# Patient Record
Sex: MALE | Race: BLACK OR AFRICAN AMERICAN | Employment: FULL TIME | URBAN - METROPOLITAN AREA
[De-identification: names, ages, dates, MRNs, and addresses within clinical notes are randomized per-mention and may not be internally consistent; named-entity substitution may affect disease eponyms.]

---

## 2018-01-30 ENCOUNTER — APPOINTMENT (OUTPATIENT)
Dept: GENERAL RADIOLOGY | Age: 38
End: 2018-01-30
Attending: PHYSICIAN ASSISTANT
Payer: COMMERCIAL

## 2018-01-30 ENCOUNTER — HOSPITAL ENCOUNTER (EMERGENCY)
Age: 38
Discharge: HOME | End: 2018-01-30
Attending: EMERGENCY MEDICINE
Payer: COMMERCIAL

## 2018-01-30 VITALS
HEART RATE: 102 BPM | SYSTOLIC BLOOD PRESSURE: 115 MMHG | WEIGHT: 195 LBS | HEIGHT: 78 IN | OXYGEN SATURATION: 95 % | DIASTOLIC BLOOD PRESSURE: 70 MMHG | RESPIRATION RATE: 20 BRPM | TEMPERATURE: 99.5 F | BODY MASS INDEX: 22.56 KG/M2

## 2018-01-30 DIAGNOSIS — J11.1 INFLUENZA: Primary | ICD-10-CM

## 2018-01-30 LAB
ANION GAP SERPL CALC-SCNC: 10 MMOL/L (ref 5–15)
APPEARANCE UR: CLEAR
BACTERIA URNS QL MICRO: NEGATIVE /HPF
BILIRUB UR QL: NEGATIVE
BUN SERPL-MCNC: 8 MG/DL (ref 6–20)
BUN/CREAT SERPL: 7 (ref 12–20)
CALCIUM SERPL-MCNC: 8.1 MG/DL (ref 8.5–10.1)
CHLORIDE SERPL-SCNC: 102 MMOL/L (ref 97–108)
CK SERPL-CCNC: 268 U/L (ref 39–308)
CO2 SERPL-SCNC: 25 MMOL/L (ref 21–32)
COLOR UR: ABNORMAL
CREAT SERPL-MCNC: 1.17 MG/DL (ref 0.7–1.3)
EPITH CASTS URNS QL MICRO: ABNORMAL /LPF
GLUCOSE SERPL-MCNC: 112 MG/DL (ref 65–100)
GLUCOSE UR STRIP.AUTO-MCNC: NEGATIVE MG/DL
HGB UR QL STRIP: ABNORMAL
HYALINE CASTS URNS QL MICRO: ABNORMAL /LPF (ref 0–5)
KETONES UR QL STRIP.AUTO: 80 MG/DL
LEUKOCYTE ESTERASE UR QL STRIP.AUTO: NEGATIVE
NITRITE UR QL STRIP.AUTO: NEGATIVE
PH UR STRIP: 6 [PH] (ref 5–8)
POTASSIUM SERPL-SCNC: 3.4 MMOL/L (ref 3.5–5.1)
PROT UR STRIP-MCNC: NEGATIVE MG/DL
RBC #/AREA URNS HPF: ABNORMAL /HPF (ref 0–5)
SODIUM SERPL-SCNC: 137 MMOL/L (ref 136–145)
SP GR UR REFRACTOMETRY: 1.02 (ref 1–1.03)
UR CULT HOLD, URHOLD: NORMAL
UROBILINOGEN UR QL STRIP.AUTO: 1 EU/DL (ref 0.2–1)
WBC URNS QL MICRO: ABNORMAL /HPF (ref 0–4)

## 2018-01-30 PROCEDURE — 74011250636 HC RX REV CODE- 250/636: Performed by: PHYSICIAN ASSISTANT

## 2018-01-30 PROCEDURE — 96361 HYDRATE IV INFUSION ADD-ON: CPT

## 2018-01-30 PROCEDURE — 71046 X-RAY EXAM CHEST 2 VIEWS: CPT

## 2018-01-30 PROCEDURE — 82550 ASSAY OF CK (CPK): CPT | Performed by: PHYSICIAN ASSISTANT

## 2018-01-30 PROCEDURE — 87086 URINE CULTURE/COLONY COUNT: CPT | Performed by: PHYSICIAN ASSISTANT

## 2018-01-30 PROCEDURE — 36415 COLL VENOUS BLD VENIPUNCTURE: CPT | Performed by: PHYSICIAN ASSISTANT

## 2018-01-30 PROCEDURE — 81001 URINALYSIS AUTO W/SCOPE: CPT | Performed by: PHYSICIAN ASSISTANT

## 2018-01-30 PROCEDURE — 80048 BASIC METABOLIC PNL TOTAL CA: CPT | Performed by: PHYSICIAN ASSISTANT

## 2018-01-30 PROCEDURE — 74011250637 HC RX REV CODE- 250/637: Performed by: PHYSICIAN ASSISTANT

## 2018-01-30 PROCEDURE — 99284 EMERGENCY DEPT VISIT MOD MDM: CPT

## 2018-01-30 PROCEDURE — 96374 THER/PROPH/DIAG INJ IV PUSH: CPT

## 2018-01-30 RX ORDER — FAMOTIDINE 20 MG/1
20 TABLET, FILM COATED ORAL
Status: COMPLETED | OUTPATIENT
Start: 2018-01-30 | End: 2018-01-30

## 2018-01-30 RX ORDER — ONDANSETRON 4 MG/1
4 TABLET, ORALLY DISINTEGRATING ORAL
Qty: 12 TAB | Refills: 0 | Status: SHIPPED | OUTPATIENT
Start: 2018-01-30

## 2018-01-30 RX ORDER — ONDANSETRON 2 MG/ML
4 INJECTION INTRAMUSCULAR; INTRAVENOUS
Status: COMPLETED | OUTPATIENT
Start: 2018-01-30 | End: 2018-01-30

## 2018-01-30 RX ORDER — ACETAMINOPHEN 325 MG/1
650 TABLET ORAL
Status: COMPLETED | OUTPATIENT
Start: 2018-01-30 | End: 2018-01-30

## 2018-01-30 RX ORDER — FAMOTIDINE 20 MG/1
20 TABLET, FILM COATED ORAL 2 TIMES DAILY
Qty: 14 TAB | Refills: 0 | Status: SHIPPED | OUTPATIENT
Start: 2018-01-30 | End: 2018-02-06

## 2018-01-30 RX ADMIN — SODIUM CHLORIDE 1000 ML: 900 INJECTION, SOLUTION INTRAVENOUS at 17:47

## 2018-01-30 RX ADMIN — ONDANSETRON 4 MG: 2 INJECTION INTRAMUSCULAR; INTRAVENOUS at 17:45

## 2018-01-30 RX ADMIN — ACETAMINOPHEN 650 MG: 325 TABLET ORAL at 17:46

## 2018-01-30 RX ADMIN — FAMOTIDINE 20 MG: 20 TABLET ORAL at 17:46

## 2018-01-30 NOTE — ED PROVIDER NOTES
HPI Comments: 39 y/o male with PMHx of gastric ulcers, presenting with complaint of influenza and hematuria. The patient states that 2 nights ago he suddenly began to feel unwell, with cough, fatigue, fever, generalized body aches and vomiting. He saw his PCP yesterday and was diagnosed with the flu. His wife states the doctor was supposed to call in a prescription for Tamiflu but the pharmacy never received the prescription. Last night the patient went to [facility] ED, where he was reportedly dehydrated and received 3L of IV fluids. This morning the patient reports seeing blood in his urine, and also noticed some streaks of blood in his vomit this afternoon. He endorses 7/10 lower abdominal pain that is sharp and non-radiating; no aggravating or alleviating factors. He had 1 episode of diarrhea yesterday, and also endorses headache, light-headedness, nausea, shortness of breath and generalized body aches. No chest pain, dysuria, urgency, frequency, rash or syncope. The history is provided by the patient and the spouse. Past Medical History:   Diagnosis Date    Gastrointestinal disorder     ulcers       Past Surgical History:   Procedure Laterality Date    HX GI      \"ulcer surgery\"         History reviewed. No pertinent family history. Social History     Social History    Marital status: N/A     Spouse name: N/A    Number of children: N/A    Years of education: N/A     Occupational History    Not on file. Social History Main Topics    Smoking status: Not on file    Smokeless tobacco: Not on file    Alcohol use Not on file    Drug use: Not on file    Sexual activity: Not on file     Other Topics Concern    Not on file     Social History Narrative         ALLERGIES: Review of patient's allergies indicates no known allergies. Review of Systems   Constitutional: Positive for chills, fatigue and fever. HENT: Positive for congestion and rhinorrhea.     Respiratory: Positive for cough and shortness of breath. Cardiovascular: Negative for chest pain. Gastrointestinal: Positive for abdominal pain, diarrhea (x1), nausea and vomiting.        + blood-streaked emesis today   Genitourinary: Positive for hematuria. Negative for dysuria, frequency and urgency. Musculoskeletal: Positive for myalgias (generalized body aches). Skin: Negative for rash. Neurological: Positive for light-headedness and headaches. Negative for syncope. All other systems reviewed and are negative. Vitals:    01/30/18 1717   BP: 135/82   Pulse: (!) 102   Resp: 20   Temp: (!) 100.6 °F (38.1 °C)   SpO2: 95%   Weight: 88.5 kg (195 lb)   Height: 6' 7\" (2.007 m)            Physical Exam   Constitutional: He is oriented to person, place, and time. He appears well-developed and well-nourished. No distress. HENT:   Head: Normocephalic and atraumatic. Mouth/Throat: Uvula is midline, oropharynx is clear and moist and mucous membranes are normal. No trismus in the jaw. No uvula swelling. Eyes: Conjunctivae and EOM are normal.   Neck: Normal range of motion. Neck supple. Cardiovascular: Regular rhythm and normal heart sounds. Tachycardia present. Pulmonary/Chest: Effort normal and breath sounds normal. No respiratory distress. He has no wheezes. He has no rales. Musculoskeletal: Normal range of motion. Lymphadenopathy:     He has no cervical adenopathy. Neurological: He is alert and oriented to person, place, and time. Skin: Skin is warm and dry. He is not diaphoretic. Nursing note and vitals reviewed.        MDM  Number of Diagnoses or Management Options  Influenza:      Amount and/or Complexity of Data Reviewed  Clinical lab tests: ordered and reviewed  Tests in the radiology section of CPT®: ordered and reviewed  Discuss the patient with other providers: yes (Dr. Nacho Ascencio, ED attending)  Independent visualization of images, tracings, or specimens: yes (CXR)    Patient Progress  Patient progress: stable      ED Course       Procedures      41 y/o male with PMHx of gastric ulcers, presenting with complaint of influenza and hematuria. Patient given 1L fluid bolus, zofran, pepcid and tylenol. CXR obtained; read by radiology and independently visualized and interpreted by myself, and reveals no evidence of pneumonia, pneumomediastinum or other acute cardiopulmonary abnormalities. UA obtained and reveals ketones and moderate blood, but no evidence of infection. Will add urine culture. Normal renal function, no elevated CK to suggest rhabdomyolysis. Patient tolerating PO, feeling somewhat better after zofran, tylenol, pepcid and fluid bolus. Safe for discharge home with Rx for zofran and pepcid. Recommended prompt PCP follow up for evaluation of symptoms and repeat urinalysis. Strict ED return precautions discussed and provided in writing at time of discharge. The patient and his wife verbalized understanding and agreement with this plan.

## 2018-01-30 NOTE — ED TRIAGE NOTES
Pt c/o flu like symptoms x 2 days. C/o bodyaches, N,V, and hematuria. Had + flu swab yesterday at [facility].

## 2018-01-31 NOTE — DISCHARGE INSTRUCTIONS

## 2018-02-01 LAB
BACTERIA SPEC CULT: ABNORMAL
CC UR VC: ABNORMAL
SERVICE CMNT-IMP: ABNORMAL

## 2024-04-25 ENCOUNTER — TELEPHONE (OUTPATIENT)
Facility: CLINIC | Age: 44
End: 2024-04-25

## 2024-04-25 NOTE — TELEPHONE ENCOUNTER
Patient called wanting Immunization records patient states that he used to see Dr. Hogan and wanted to know if we had them. Can you please look in [place] to see if they might be in there please. And let patient know .

## 2024-04-26 NOTE — TELEPHONE ENCOUNTER
Spoke to patient, he was looking for a Hepatitis B vaccine.  I advised him that I did not see one in [place] or in [facility].  He wanted to know how he could get his medical records sent to his new provider.  I advised him that he can actually sign a medical release at his new providers office and have it faxed to our office and that we would send it to our records group [facility] and they would process the medical records and send them to his new provider.    I also advised patient that if he needed his Hep B status for a new job that he could ask his new provider to draw the titers for that at it would tell him if he had an immunity to Hep B or not.